# Patient Record
Sex: FEMALE | Race: WHITE | NOT HISPANIC OR LATINO | Employment: FULL TIME | URBAN - METROPOLITAN AREA
[De-identification: names, ages, dates, MRNs, and addresses within clinical notes are randomized per-mention and may not be internally consistent; named-entity substitution may affect disease eponyms.]

---

## 2017-05-31 ENCOUNTER — HOSPITAL ENCOUNTER (OUTPATIENT)
Facility: MEDICAL CENTER | Age: 57
End: 2017-05-31
Attending: INTERNAL MEDICINE
Payer: OTHER GOVERNMENT

## 2017-05-31 PROCEDURE — 83615 LACTATE (LD) (LDH) ENZYME: CPT

## 2017-05-31 PROCEDURE — 80053 COMPREHEN METABOLIC PANEL: CPT

## 2017-06-01 LAB
ALBUMIN SERPL BCP-MCNC: 4.3 G/DL (ref 3.2–4.9)
ALBUMIN/GLOB SERPL: 1.7 G/DL
ALP SERPL-CCNC: 96 U/L (ref 30–99)
ALT SERPL-CCNC: 24 U/L (ref 2–50)
AMBIGUOUS DTTM AMBI4: NORMAL
ANION GAP SERPL CALC-SCNC: 7 MMOL/L (ref 0–11.9)
AST SERPL-CCNC: 16 U/L (ref 12–45)
BILIRUB SERPL-MCNC: 0.3 MG/DL (ref 0.1–1.5)
BUN SERPL-MCNC: 15 MG/DL (ref 8–22)
CALCIUM SERPL-MCNC: 10.1 MG/DL (ref 8.5–10.5)
CHLORIDE SERPL-SCNC: 109 MMOL/L (ref 96–112)
CO2 SERPL-SCNC: 25 MMOL/L (ref 20–33)
CREAT SERPL-MCNC: 0.71 MG/DL (ref 0.5–1.4)
GFR SERPL CREATININE-BSD FRML MDRD: >60 ML/MIN/1.73 M 2
GLOBULIN SER CALC-MCNC: 2.5 G/DL (ref 1.9–3.5)
GLUCOSE SERPL-MCNC: 113 MG/DL (ref 65–99)
LDH SERPL-CCNC: 148 U/L (ref 107–266)
POTASSIUM SERPL-SCNC: 4.8 MMOL/L (ref 3.6–5.5)
PROT SERPL-MCNC: 6.8 G/DL (ref 6–8.2)
SODIUM SERPL-SCNC: 141 MMOL/L (ref 135–145)

## 2017-10-02 ENCOUNTER — APPOINTMENT (OUTPATIENT)
Dept: RADIOLOGY | Facility: IMAGING CENTER | Age: 57
End: 2017-10-02
Attending: PHYSICIAN ASSISTANT
Payer: OTHER GOVERNMENT

## 2017-10-02 ENCOUNTER — OFFICE VISIT (OUTPATIENT)
Dept: URGENT CARE | Facility: PHYSICIAN GROUP | Age: 57
End: 2017-10-02
Payer: OTHER GOVERNMENT

## 2017-10-02 VITALS
RESPIRATION RATE: 12 BRPM | HEIGHT: 66 IN | BODY MASS INDEX: 24.75 KG/M2 | WEIGHT: 154 LBS | HEART RATE: 84 BPM | OXYGEN SATURATION: 96 % | DIASTOLIC BLOOD PRESSURE: 70 MMHG | SYSTOLIC BLOOD PRESSURE: 100 MMHG | TEMPERATURE: 98 F

## 2017-10-02 DIAGNOSIS — M54.50 ACUTE EXACERBATION OF CHRONIC LOW BACK PAIN: ICD-10-CM

## 2017-10-02 DIAGNOSIS — G89.29 ACUTE EXACERBATION OF CHRONIC LOW BACK PAIN: ICD-10-CM

## 2017-10-02 PROCEDURE — 72100 X-RAY EXAM L-S SPINE 2/3 VWS: CPT | Mod: TC | Performed by: PHYSICIAN ASSISTANT

## 2017-10-02 PROCEDURE — 99203 OFFICE O/P NEW LOW 30 MIN: CPT | Performed by: PHYSICIAN ASSISTANT

## 2017-10-02 RX ORDER — CYCLOBENZAPRINE HCL 10 MG
10 TABLET ORAL 3 TIMES DAILY PRN
Qty: 30 TAB | Refills: 0 | Status: SHIPPED
Start: 2017-10-02 | End: 2020-04-30

## 2017-10-02 RX ORDER — HYDROXYUREA 500 MG/1
CAPSULE ORAL DAILY
COMMUNITY

## 2017-10-02 NOTE — PROGRESS NOTES
Chief Complaint   Patient presents with   • Back Pain     ongoing injury, twisted at home, dull pain       HISTORY OF PRESENT ILLNESS: Patient is a 57 y.o. female who presents today for the following:    Patient complains of low back pain that started yesterday. Patient has a history of the same off and on over the last several years. She states only takes the slightest movement to cause problems. She has been taking ibuprofen 800, some unknown muscle relaxer, and heat, and states that all 3 have been helping. She had a difficult time even getting out of bed this morning was having so much pain she was unable to support her weight. She denies any radiating pain, distal paresthesias, bowel/bladder incontinence changes and extremity weakness. She has never had any imaging of her back. She is using a cane today just to help support walking.    There are no active problems to display for this patient.      Allergies:Review of patient's allergies indicates no known allergies.    Current Outpatient Prescriptions Ordered in [facility]   Medication Sig Dispense Refill   • hydroxyurea (HYDREA) 500 MG Cap Take  by mouth every day.     • Probiotic Product (SOLUBLE FIBER/PROBIOTICS PO) Take  by mouth every day.       No current [facility]-ordered facility-administered medications on file.        Past Medical History:   Diagnosis Date   • Other specified symptom associated with female genital organs     bleeding   • Thrombocytosis (CMS-HCC)    • Unspecified urinary incontinence     slight amount sometimes       Social History   Substance Use Topics   • Smoking status: Never Smoker   • Smokeless tobacco: Never Used   • Alcohol use No       Family Status   Relation Status   • Mother Alive   • Father Alive   • Sister Alive     Family History   Problem Relation Age of Onset   • Diabetes Mother        ROS:    Review of Systems   Constitutional: Negative for fever, chills, weight loss and malaise/fatigue.   HENT: Negative for ear pain,  "nosebleeds, congestion, sore throat and neck pain.    Eyes: Negative for blurred vision.   Respiratory: Negative for cough, sputum production, shortness of breath and wheezing.    Cardiovascular: Negative for chest pain, palpitations, orthopnea and leg swelling.   Gastrointestinal: Negative for heartburn, nausea, vomiting and abdominal pain.   Genitourinary: Negative for dysuria, urgency and frequency.       Exam:  Blood pressure 100/70, pulse 84, temperature 36.7 °C (98 °F), resp. rate 12, height 1.676 m (5' 6\"), weight 69.9 kg (154 lb), last menstrual period 06/09/2009, SpO2 96 %.  General: Well developed, well nourished. No distress.  HEENT:Head is grossly normal.  Pulmonary: No respiratory distress noted.  Back: Decreased range of motion in all planes due to pain. Worsening pain going from a seated to standing position. No localized tenderness noted.  Neurologic: No sensory deficit noted.  Extremities: No motor deficit noted. Prepatellar DTRs are strong bilaterally. Strong plantar/dorsiflexion.  Skin: Warm, dry, good turgor. No rashes in visible areas.   Psych: Normal mood. Alert and oriented x3. Judgment and insight is normal.    Lumbar x-ray, per radiology:  Impression       1.  Minimal upper lumbar scoliosis or spasm convex right.    2.  No compression fracture or disc space narrowing.       Assessment/Plan:  Apply heat/ice to the affected area. Take all medication instructed. Discussed appropriate over-the-counter symptomatic medication, and when to return to clinic. Follow up with Primary care provider for worsening or persistent symptoms as physical therapy may be necessary.  1. Acute exacerbation of chronic low back pain  DX-LUMBAR SPINE-2 OR 3 VIEWS       "

## 2019-04-24 ENCOUNTER — HOSPITAL ENCOUNTER (OUTPATIENT)
Facility: MEDICAL CENTER | Age: 59
End: 2019-04-24
Attending: PHYSICIAN ASSISTANT
Payer: OTHER GOVERNMENT

## 2019-04-24 ENCOUNTER — OFFICE VISIT (OUTPATIENT)
Dept: URGENT CARE | Facility: PHYSICIAN GROUP | Age: 59
End: 2019-04-24
Payer: OTHER GOVERNMENT

## 2019-04-24 VITALS
BODY MASS INDEX: 25.82 KG/M2 | HEART RATE: 81 BPM | OXYGEN SATURATION: 97 % | TEMPERATURE: 97.6 F | SYSTOLIC BLOOD PRESSURE: 124 MMHG | RESPIRATION RATE: 16 BRPM | WEIGHT: 160 LBS | DIASTOLIC BLOOD PRESSURE: 82 MMHG

## 2019-04-24 DIAGNOSIS — N30.01 ACUTE CYSTITIS WITH HEMATURIA: ICD-10-CM

## 2019-04-24 DIAGNOSIS — R35.0 URINARY FREQUENCY: ICD-10-CM

## 2019-04-24 DIAGNOSIS — R30.0 DYSURIA: ICD-10-CM

## 2019-04-24 DIAGNOSIS — R39.15 URINARY URGENCY: ICD-10-CM

## 2019-04-24 LAB
APPEARANCE UR: CLEAR
BILIRUB UR STRIP-MCNC: NORMAL MG/DL
COLOR UR AUTO: YELLOW
GLUCOSE UR STRIP.AUTO-MCNC: NORMAL MG/DL
KETONES UR STRIP.AUTO-MCNC: NORMAL MG/DL
LEUKOCYTE ESTERASE UR QL STRIP.AUTO: NORMAL
NITRITE UR QL STRIP.AUTO: NORMAL
PH UR STRIP.AUTO: 5.5 [PH] (ref 5–8)
PROT UR QL STRIP: NORMAL MG/DL
RBC UR QL AUTO: NORMAL
SP GR UR STRIP.AUTO: 1.01
UROBILINOGEN UR STRIP-MCNC: 0.2 MG/DL

## 2019-04-24 PROCEDURE — 87077 CULTURE AEROBIC IDENTIFY: CPT

## 2019-04-24 PROCEDURE — 99214 OFFICE O/P EST MOD 30 MIN: CPT | Performed by: PHYSICIAN ASSISTANT

## 2019-04-24 PROCEDURE — 87086 URINE CULTURE/COLONY COUNT: CPT

## 2019-04-24 PROCEDURE — 81002 URINALYSIS NONAUTO W/O SCOPE: CPT | Performed by: PHYSICIAN ASSISTANT

## 2019-04-24 PROCEDURE — 87186 SC STD MICRODIL/AGAR DIL: CPT

## 2019-04-24 RX ORDER — NITROFURANTOIN 25; 75 MG/1; MG/1
100 CAPSULE ORAL EVERY 12 HOURS
Qty: 10 CAP | Refills: 0 | Status: SHIPPED | OUTPATIENT
Start: 2019-04-24 | End: 2019-04-29

## 2019-04-24 NOTE — PROGRESS NOTES
Chief Complaint   Patient presents with   • UTI     x 4 days    • Urinary Frequency       HISTORY OF PRESENT ILLNESS: Patient is a 59 y.o. female who presents today for the following:    Patient comes in for evaluation of dysuria that started 3 to 4 days ago.  She complains of increased urinary frequency and urgency.  She denies hematuria, abdominal pain, flank pain, nausea, vomiting, fever.  She does report a history of the same about 3 years ago.    There are no active problems to display for this patient.      Allergies:Patient has no known allergies.    Current Outpatient Prescriptions Ordered in [facility]   Medication Sig Dispense Refill   • nitrofurantoin monohyd macro (MACROBID) 100 MG Cap Take 1 Cap by mouth every 12 hours for 5 days. 10 Cap 0   • hydroxyurea (HYDREA) 500 MG Cap Take  by mouth every day.     • Probiotic Product (SOLUBLE FIBER/PROBIOTICS PO) Take  by mouth every day.     • cyclobenzaprine (FLEXERIL) 10 MG Tab Take 1 Tab by mouth 3 times a day as needed for Mild Pain or Moderate Pain. 30 Tab 0     No current Epic-ordered facility-administered medications on file.        Past Medical History:   Diagnosis Date   • Other specified symptom associated with female genital organs     bleeding   • Thrombocytosis (HCC)    • Unspecified urinary incontinence     slight amount sometimes       Social History   Substance Use Topics   • Smoking status: Never Smoker   • Smokeless tobacco: Never Used   • Alcohol use No       Family Status   Relation Status   • Mo Alive   • Fa Alive   • Sis Alive     Family History   Problem Relation Age of Onset   • Diabetes Mother        Review of Systems:    Constitutional ROS: No unexpected change in weight, No weakness, No fatigue  Eye ROS: No recent significant change in vision, No eye pain, redness, discharge  Ear ROS: No drainage, No tinnitus or vertigo, No recent change in hearing  Mouth/Throat ROS: No teeth or gum problems, No bleeding gums, No tongue complaints  Neck ROS:  No swollen glands, No significant pain in neck  Pulmonary ROS: No chronic cough, sputum, or hemoptysis, No dyspnea on exertion, No wheezing  Cardiovascular ROS: No diaphoresis, No edema, No palpitations  Gastrointestinal ROS: No change in bowel habits, No significant change in appetite, No nausea, vomiting, diarrhea, or constipation  Musculoskeletal/Extremities ROS: No peripheral edema, No pain, redness or swelling on the joints  Hematologic/Lymphatic ROS: No chills, No night sweats, No weight loss  Skin/Integumentary ROS: No edema, No evidence of rash, No itching      Exam:  /82   Pulse 81   Temp 36.4 °C (97.6 °F) (Temporal)   Resp 16   Wt 72.6 kg (160 lb)   SpO2 97%   General: Well developed, well nourished. No distress.  Pulmonary: Unlabored respiratory effort. Lungs clear to auscultation, no wheezes, no rhonchi.  Cardiovascular: Regular rate and rhythm without murmur.   Back: No CVA tenderness noted.  Neurologic: Grossly nonfocal. No facial asymmetry noted.  Skin: Warm, dry, good turgor. No rashes in visible areas.   Psych: Normal mood. Alert and oriented x3. Judgment and insight is normal.    UA: Large blood, large leukocyte esterase, otherwise negative    Assessment/Plan:  Drink plenty fluids.  Use all medication as directed.  Will contact patient with culture result. Follow up for worsening or persistent symptoms.  1. Acute cystitis with hematuria  Urine Culture    nitrofurantoin monohyd macro (MACROBID) 100 MG Cap   2. Dysuria  POCT Urinalysis   3. Urinary frequency  POCT Urinalysis   4. Urinary urgency  POCT Urinalysis   \

## 2019-04-26 DIAGNOSIS — N39.0 URINARY TRACT INFECTION WITHOUT HEMATURIA, SITE UNSPECIFIED: ICD-10-CM

## 2019-04-26 LAB
BACTERIA UR CULT: ABNORMAL
BACTERIA UR CULT: ABNORMAL
SIGNIFICANT IND 70042: ABNORMAL
SITE SITE: ABNORMAL
SOURCE SOURCE: ABNORMAL

## 2019-04-26 RX ORDER — SULFAMETHOXAZOLE AND TRIMETHOPRIM 800; 160 MG/1; MG/1
1 TABLET ORAL 2 TIMES DAILY
Qty: 10 TAB | Refills: 0 | Status: SHIPPED | OUTPATIENT
Start: 2019-04-26 | End: 2019-05-01

## 2020-04-30 ENCOUNTER — OFFICE VISIT (OUTPATIENT)
Dept: URGENT CARE | Facility: PHYSICIAN GROUP | Age: 60
End: 2020-04-30
Payer: OTHER GOVERNMENT

## 2020-04-30 VITALS
TEMPERATURE: 98.2 F | DIASTOLIC BLOOD PRESSURE: 78 MMHG | BODY MASS INDEX: 31.82 KG/M2 | OXYGEN SATURATION: 97 % | RESPIRATION RATE: 14 BRPM | SYSTOLIC BLOOD PRESSURE: 122 MMHG | WEIGHT: 198 LBS | HEIGHT: 66 IN | HEART RATE: 79 BPM

## 2020-04-30 DIAGNOSIS — N30.01 ACUTE CYSTITIS WITH HEMATURIA: ICD-10-CM

## 2020-04-30 DIAGNOSIS — R30.9 URINARY PAIN: ICD-10-CM

## 2020-04-30 LAB
APPEARANCE UR: CLEAR
BILIRUB UR STRIP-MCNC: NEGATIVE MG/DL
COLOR UR AUTO: YELLOW
GLUCOSE UR STRIP.AUTO-MCNC: NEGATIVE MG/DL
KETONES UR STRIP.AUTO-MCNC: NEGATIVE MG/DL
LEUKOCYTE ESTERASE UR QL STRIP.AUTO: NORMAL
NITRITE UR QL STRIP.AUTO: NEGATIVE
PH UR STRIP.AUTO: 5.5 [PH] (ref 5–8)
PROT UR QL STRIP: NEGATIVE MG/DL
RBC UR QL AUTO: NORMAL
SP GR UR STRIP.AUTO: 1.02
UROBILINOGEN UR STRIP-MCNC: 0.2 MG/DL

## 2020-04-30 PROCEDURE — 81002 URINALYSIS NONAUTO W/O SCOPE: CPT | Performed by: NURSE PRACTITIONER

## 2020-04-30 PROCEDURE — 99214 OFFICE O/P EST MOD 30 MIN: CPT | Performed by: NURSE PRACTITIONER

## 2020-04-30 RX ORDER — SULFAMETHOXAZOLE AND TRIMETHOPRIM 800; 160 MG/1; MG/1
1 TABLET ORAL 2 TIMES DAILY
Qty: 6 TAB | Refills: 0 | Status: SHIPPED | OUTPATIENT
Start: 2020-04-30 | End: 2020-05-03

## 2020-04-30 RX ORDER — PHENAZOPYRIDINE HYDROCHLORIDE 200 MG/1
TABLET, FILM COATED ORAL
COMMUNITY
Start: 2020-02-01

## 2020-04-30 RX ORDER — NITROFURANTOIN 25; 75 MG/1; MG/1
CAPSULE ORAL
COMMUNITY
Start: 2020-02-01 | End: 2020-04-30

## 2020-04-30 RX ORDER — HYDROXYUREA 500 MG/1
CAPSULE ORAL
COMMUNITY
Start: 2014-08-21 | End: 2020-04-30

## 2020-04-30 NOTE — PROGRESS NOTES
Subjective:      Karmen Bassett is a 60 y.o. female who presents with UTI (pain with urniation 3x days.)          Subjective:      CC:  presents with Dysuria        Dysuria   This is a new problem. The current episode started in the past 3 days. The problem occurs every urination. The problem has been unchanged. The quality of the pain is described as burning. There has been no fever. Pt is not sexually active. There is no history of pyelonephritis. Associated symptoms include frequency and urgency. Pertinent negatives include no chills, discharge, flank pain, nausea or vomiting. Pt has tried cranberry for the symptoms. There is no history of recurrent UTIs.     Social History     Socioeconomic History   • Marital status:      Spouse name: Not on file   • Number of children: Not on file   • Years of education: Not on file   • Highest education level: Not on file   Occupational History   • Not on file   Social Needs   • Financial resource strain: Not on file   • Food insecurity     Worry: Not on file     Inability: Not on file   • Transportation needs     Medical: Not on file     Non-medical: Not on file   Tobacco Use   • Smoking status: Never Smoker   • Smokeless tobacco: Never Used   Substance and Sexual Activity   • Alcohol use: No   • Drug use: No   • Sexual activity: Yes     Partners: Male   Lifestyle   • Physical activity     Days per week: Not on file     Minutes per session: Not on file   • Stress: Not on file   Relationships   • Social connections     Talks on phone: Not on file     Gets together: Not on file     Attends [employer] service: Not on file     Active member of club or organization: Not on file     Attends meetings of clubs or organizations: Not on file     Relationship status: Not on file   • Intimate partner violence     Fear of current or ex partner: Not on file     Emotionally abused: Not on file     Physically abused: Not on file     Forced sexual activity: Not on file   Other  "Topics Concern   • Not on file   Social History Narrative   • Not on file         Family History   Problem Relation Age of Onset   • Diabetes Mother          No Known Allergies        Current Outpatient Medications on File Prior to Visit   Medication Sig Dispense Refill   • hydroxyurea (HYDREA) 500 MG Cap Take  by mouth every day.     • phenazopyridine (PYRIDIUM) 200 MG Tab      • Probiotic Product (SOLUBLE FIBER/PROBIOTICS PO) Take  by mouth every day.       No current facility-administered medications on file prior to visit.        Review of Systems   Constitutional: Negative for chills.   Respiratory: Negative for shortness of breath.    Cardiovascular: Negative for chest pain.   Gastrointestinal: Negative for nausea, vomiting and abdominal pain.   Genitourinary: Positive for dysuria, urgency and frequency. Negative for flank pain.   Skin: Negative for rash.   Neurological: Negative for dizziness and headaches.   All other systems reviewed and are negative.         Objective:      /78   Pulse 79   Temp 36.8 °C (98.2 °F) (Temporal)   Resp 14   Ht 1.676 m (5' 6\")   Wt 89.8 kg (198 lb)   SpO2 97%       Physical Exam   Constitutional: pt is oriented to person, place, and time. Pt appears well-developed and well-nourished. No distress.   HENT:   Head: Normocephalic and atraumatic.   Mouth/Throat: Mucous membranes are normal.   Eyes: Conjunctivae and EOM are normal. Pupils are equal, round, and reactive to light. Right eye exhibits no discharge. Left eye exhibits no discharge. No scleral icterus.   Neck: Normal range of motion. Neck supple.   Cardiovascular: Normal rate, regular rhythm, normal heart sounds and intact distal pulses.    No murmur heard.  Pulmonary/Chest: Effort normal and breath sounds normal. No respiratory distress. Pt has no wheezes,  rales.   Abdominal: Bowel sounds are normal. Pt exhibits no distension and no mass. There is no tenderness. There is no rebound, no guarding and no CVA " tenderness.   Neurological: pt is alert and oriented to person, place, and time.   Skin: Skin is warm and dry.   Psychiatric: behavior is normal.   Nursing note and vitals reviewed.           Lab Results   Component Value Date/Time    POCCOLOR yellow 04/30/2020 01:46 PM    POCAPPEAR clear 04/30/2020 01:46 PM    POCLEUKEST small 04/30/2020 01:46 PM    POCNITRITE negative 04/30/2020 01:46 PM    POCUROBILIGE 0.2 04/30/2020 01:46 PM    POCPROTEIN negative 04/30/2020 01:46 PM    POCURPH 5.5 04/30/2020 01:46 PM    POCBLOOD trace-intact 04/30/2020 01:46 PM    POCSPGRV 1.025 04/30/2020 01:46 PM    POCKETONES negative 04/30/2020 01:46 PM    POCBILIRUBIN negative 04/30/2020 01:46 PM    POCGLUCUA negative 04/30/2020 01:46 PM            Assessment/Plan:     1. Acute cystitis with hematuria  Last culture showed resistance to Nitrofurantoin. Not enough urine to culture today.   - POCT Urinalysis  - sulfamethoxazole-trimethoprim (BACTRIM DS) 800-160 MG tablet; Take 1 Tab by mouth 2 times a day for 3 days.  Dispense: 6 Tab; Refill: 0        Instructed to return to  or nearest emergency department if symptoms fail to improve, for any change in condition, further concerns, or new concerning symptoms.  Patient states understanding of the plan of care and discharge instructions.

## 2020-05-28 ENCOUNTER — OFFICE VISIT (OUTPATIENT)
Dept: URGENT CARE | Facility: PHYSICIAN GROUP | Age: 60
End: 2020-05-28
Payer: OTHER GOVERNMENT

## 2020-05-28 VITALS
BODY MASS INDEX: 31.98 KG/M2 | DIASTOLIC BLOOD PRESSURE: 80 MMHG | SYSTOLIC BLOOD PRESSURE: 124 MMHG | WEIGHT: 199 LBS | HEIGHT: 66 IN | TEMPERATURE: 98.3 F | OXYGEN SATURATION: 97 % | RESPIRATION RATE: 16 BRPM | HEART RATE: 80 BPM

## 2020-05-28 DIAGNOSIS — R30.9 PAINFUL URINATION: ICD-10-CM

## 2020-05-28 DIAGNOSIS — N30.01 ACUTE CYSTITIS WITH HEMATURIA: ICD-10-CM

## 2020-05-28 DIAGNOSIS — N39.0 RECURRENT URINARY TRACT INFECTION: ICD-10-CM

## 2020-05-28 LAB
APPEARANCE UR: ABNORMAL
BILIRUB UR STRIP-MCNC: NEGATIVE MG/DL
COLOR UR AUTO: YELLOW
GLUCOSE UR STRIP.AUTO-MCNC: NEGATIVE MG/DL
KETONES UR STRIP.AUTO-MCNC: NEGATIVE MG/DL
LEUKOCYTE ESTERASE UR QL STRIP.AUTO: ABNORMAL
NITRITE UR QL STRIP.AUTO: NEGATIVE
PH UR STRIP.AUTO: 7 [PH] (ref 5–8)
PROT UR QL STRIP: NEGATIVE MG/DL
RBC UR QL AUTO: ABNORMAL
SP GR UR STRIP.AUTO: 1.01
UROBILINOGEN UR STRIP-MCNC: 0.2 MG/DL

## 2020-05-28 PROCEDURE — 81002 URINALYSIS NONAUTO W/O SCOPE: CPT | Performed by: PHYSICIAN ASSISTANT

## 2020-05-28 PROCEDURE — 99213 OFFICE O/P EST LOW 20 MIN: CPT | Performed by: PHYSICIAN ASSISTANT

## 2020-05-28 RX ORDER — CEPHALEXIN 500 MG/1
500 CAPSULE ORAL 2 TIMES DAILY
Qty: 14 CAP | Refills: 0 | Status: SHIPPED | OUTPATIENT
Start: 2020-05-28 | End: 2020-06-04

## 2020-05-28 ASSESSMENT — ENCOUNTER SYMPTOMS
FLANK PAIN: 0
SORE THROAT: 0
EYE PAIN: 0
VOMITING: 0
FEVER: 0
MYALGIAS: 0
SHORTNESS OF BREATH: 0
COUGH: 0
NAUSEA: 0
ABDOMINAL PAIN: 0
CONSTIPATION: 0
DIARRHEA: 0
HEADACHES: 0
CHILLS: 0

## 2020-05-28 NOTE — PATIENT INSTRUCTIONS
Urinary Tract Infection, Adult  Introduction  A urinary tract infection (UTI) is an infection of any part of the urinary tract. The urinary tract includes the:  · Kidneys.  · Ureters.  · Bladder.  · Urethra.  These organs make, store, and get rid of pee (urine) in the body.  Follow these instructions at home:  · Take over-the-counter and prescription medicines only as told by your doctor.  · If you were prescribed an antibiotic medicine, take it as told by your doctor. Do not stop taking the antibiotic even if you start to feel better.  · Avoid the following drinks:  ¨ Alcohol.  ¨ Caffeine.  ¨ Tea.  ¨ Carbonated drinks.  · Drink enough fluid to keep your pee clear or pale yellow.  · Keep all follow-up visits as told by your doctor. This is important.  · Make sure to:  ¨ Empty your bladder often and completely. Do not to hold pee for long periods of time.  ¨ Empty your bladder before and after sex.  ¨ Wipe from front to back after a bowel movement if you are female. Use each tissue one time when you wipe.  Contact a doctor if:  · You have back pain.  · You have a fever.  · You feel sick to your stomach (nauseous).  · You throw up (vomit).  · Your symptoms do not get better after 3 days.  · Your symptoms go away and then come back.  Get help right away if:  · You have very bad back pain.  · You have very bad lower belly (abdominal) pain.  · You are throwing up and cannot keep down any medicines or water.  This information is not intended to replace advice given to you by your health care provider. Make sure you discuss any questions you have with your health care provider.  Document Released: 06/05/2009 Document Revised: 05/25/2017 Document Reviewed: 11/07/2016  © 2017 [employer]

## 2020-05-28 NOTE — PROGRESS NOTES
"Subjective:   Karmen Bassett is a 60 y.o. female who presents for Painful Urination (pressure/ had one a month ago)      This 60-year-old female presents complaining of dysuria, frequency, urgency, and some bladder irritation that is been waxing and waning for about a month.  This is her fourth episode of urinary discomfort in the last 6 months.  She was seen about a month ago and was unable to produce enough urine to have it cultured however was empirically treated with Bactrim and she states it had minimal effect on her symptoms.  She has been self treating with cranberry juice and staying hydrated.  She denies any fever/chills and back pain.  She has not noted any vaginal discharge, rash, or lesions.      Review of Systems   Constitutional: Negative for chills and fever.   HENT: Negative for congestion, ear pain and sore throat.    Eyes: Negative for pain.   Respiratory: Negative for cough and shortness of breath.    Cardiovascular: Negative for chest pain.   Gastrointestinal: Negative for abdominal pain, constipation, diarrhea, nausea and vomiting.   Genitourinary: Positive for dysuria, frequency and urgency. Negative for flank pain and hematuria.   Musculoskeletal: Negative for myalgias.   Skin: Negative for rash.   Neurological: Negative for headaches.       Medications, Allergies, and current problem list reviewed today in Epic.     Objective:     /80   Pulse 80   Temp 36.8 °C (98.3 °F) (Temporal)   Resp 16   Ht 1.676 m (5' 6\")   Wt 90.3 kg (199 lb)   SpO2 97%     Physical Exam  Vitals signs reviewed.   Constitutional:       Appearance: Normal appearance. She is not ill-appearing.   HENT:      Head: Normocephalic and atraumatic.      Right Ear: External ear normal.      Left Ear: External ear normal.      Nose: Nose normal.      Mouth/Throat:      Mouth: Mucous membranes are moist.   Eyes:      Conjunctiva/sclera: Conjunctivae normal.   Cardiovascular:      Rate and Rhythm: Normal rate. "   Pulmonary:      Effort: Pulmonary effort is normal.   Abdominal:      Tenderness: There is no abdominal tenderness. There is no right CVA tenderness or left CVA tenderness.   Skin:     General: Skin is warm and dry.      Capillary Refill: Capillary refill takes less than 2 seconds.   Neurological:      Mental Status: She is alert and oriented to person, place, and time.         Lab Results/POC Test Results   Results for orders placed or performed in visit on 05/28/20   POCT Urinalysis   Result Value Ref Range    POC Color yellow Negative    POC Appearance cloudy Negative    POC Leukocyte Esterase large Negative    POC Nitrites negative Negative    POC Urobiligen 0.2 Negative (0.2) mg/dL    POC Protein negative Negative mg/dL    POC Urine PH 7.0 5.0 - 8.0    POC Blood small Negative    POC Specific Gravity 1.015 <1.005 - >1.030    POC Ketones negative Negative mg/dL    POC Bilirubin negative Negative mg/dL    POC Glucose negative Negative mg/dL           Assessment/Plan:     Diagnosis and associated orders:     1. Painful urination  POCT Urinalysis   2. Acute cystitis with hematuria  cephALEXin (KEFLEX) 500 MG Cap    URINE CULTURE(NEW)      Comments/MDM:       • Empiric treatment with Keflex  • Warned about signs and symptoms of pyelonephritis and return precautions  • Will culture urine patient gave verbal permission to contact her and leave a voicemail if we need to change her antibiotic, otherwise will not contact her  • Referral to urology as this seems to be the fourth time in recent months.  She is attributing this to menopause but is worth further investigation.             Differential diagnosis, natural history, supportive care, and indications for immediate follow-up discussed.    Advised the patient to follow-up with the primary care physician for recheck, reevaluation, and consideration of further management.    Please note that this dictation was created using voice recognition software. I have worked  with consultants from the vendor as well as technical experts from [facility] to optimize the interface. I have made every reasonable attempt to correct obvious errors, but I expect that there are errors of grammar and possibly content that I did not discover before finalizing the note.    This note was electronically signed by Abdoulaye Franks PA-C

## 2020-10-02 ENCOUNTER — OFFICE VISIT (OUTPATIENT)
Dept: URGENT CARE | Facility: PHYSICIAN GROUP | Age: 60
End: 2020-10-02
Payer: OTHER GOVERNMENT

## 2020-10-02 VITALS
RESPIRATION RATE: 16 BRPM | BODY MASS INDEX: 32.47 KG/M2 | DIASTOLIC BLOOD PRESSURE: 76 MMHG | HEIGHT: 66 IN | OXYGEN SATURATION: 96 % | TEMPERATURE: 99.1 F | HEART RATE: 90 BPM | WEIGHT: 202 LBS | SYSTOLIC BLOOD PRESSURE: 112 MMHG

## 2020-10-02 DIAGNOSIS — N30.01 ACUTE CYSTITIS WITH HEMATURIA: ICD-10-CM

## 2020-10-02 DIAGNOSIS — R30.0 DYSURIA: ICD-10-CM

## 2020-10-02 LAB
APPEARANCE UR: NORMAL
BILIRUB UR STRIP-MCNC: NEGATIVE MG/DL
COLOR UR AUTO: YELLOW
GLUCOSE UR STRIP.AUTO-MCNC: NEGATIVE MG/DL
KETONES UR STRIP.AUTO-MCNC: NEGATIVE MG/DL
LEUKOCYTE ESTERASE UR QL STRIP.AUTO: NORMAL
NITRITE UR QL STRIP.AUTO: NEGATIVE
PH UR STRIP.AUTO: 7 [PH] (ref 5–8)
PROT UR QL STRIP: NEGATIVE MG/DL
RBC UR QL AUTO: NORMAL
SP GR UR STRIP.AUTO: 1.01
UROBILINOGEN UR STRIP-MCNC: 0.2 MG/DL

## 2020-10-02 PROCEDURE — 99214 OFFICE O/P EST MOD 30 MIN: CPT | Performed by: NURSE PRACTITIONER

## 2020-10-02 PROCEDURE — 81002 URINALYSIS NONAUTO W/O SCOPE: CPT | Performed by: NURSE PRACTITIONER

## 2020-10-02 RX ORDER — TRIAMCINOLONE ACETONIDE 1 MG/G
OINTMENT TOPICAL
COMMUNITY
Start: 2020-07-23

## 2020-10-02 RX ORDER — PHENAZOPYRIDINE HYDROCHLORIDE 200 MG/1
200 TABLET, FILM COATED ORAL 3 TIMES DAILY
Qty: 6 TAB | Refills: 0 | Status: SHIPPED | OUTPATIENT
Start: 2020-10-02 | End: 2020-10-04

## 2020-10-02 RX ORDER — NITROFURANTOIN 25; 75 MG/1; MG/1
100 CAPSULE ORAL 2 TIMES DAILY
COMMUNITY
Start: 2020-08-31

## 2020-10-02 RX ORDER — CEFDINIR 300 MG/1
300 CAPSULE ORAL EVERY 12 HOURS
Qty: 14 CAP | Refills: 0 | Status: CANCELLED | OUTPATIENT
Start: 2020-10-02 | End: 2020-10-09

## 2020-10-02 RX ORDER — CLOBETASOL PROPIONATE 0.5 MG/G
OINTMENT TOPICAL
COMMUNITY
Start: 2020-07-23

## 2020-10-02 RX ORDER — CEPHALEXIN 500 MG/1
500 CAPSULE ORAL EVERY 6 HOURS
Qty: 28 CAP | Refills: 0 | Status: SHIPPED | OUTPATIENT
Start: 2020-10-02 | End: 2020-10-09

## 2020-10-02 ASSESSMENT — ENCOUNTER SYMPTOMS
CONSTIPATION: 0
FEVER: 0
DIZZINESS: 0
CHILLS: 0
FLANK PAIN: 0
VOMITING: 0
HEADACHES: 0
DIARRHEA: 0
BACK PAIN: 0
NAUSEA: 0
MYALGIAS: 0
ABDOMINAL PAIN: 0
WEAKNESS: 0

## 2020-10-03 NOTE — PROGRESS NOTES
Subjective:      Karmen Bassett is a 60 y.o. female who presents with UTI (burning sensation with pressure )            HPI   C/o dysuria, urgency, frequency x 3 days.  has been taking cranberry like supplement that has improved pain.  has been having recurrent UTIs since 4/2020. Has appoint with Urology this month. Denies fever, n/v or abdominal/back pain. Denies hematuria.     PMH:  has a past medical history of Other specified symptom associated with female genital organs, Thrombocytosis (HCC), and Unspecified urinary incontinence.  MEDS:   Current Outpatient Medications:   •  cephALEXin (KEFLEX) 500 MG Cap, Take 1 Cap by mouth every 6 hours for 7 days., Disp: 28 Cap, Rfl: 0  •  phenazopyridine (PYRIDIUM) 200 MG Tab, Take 1 Tab by mouth 3 times a day for 2 days., Disp: 6 Tab, Rfl: 0  •  Cetirizine HCl (ZYRTEC PO), Take  by mouth., Disp: , Rfl:   •  hydroxyurea (HYDREA) 500 MG Cap, Take  by mouth every day., Disp: , Rfl:   •  clobetasol (TEMOVATE) 0.05 % Ointment, , Disp: , Rfl:   •  triamcinolone acetonide (KENALOG) 0.1 % Ointment, , Disp: , Rfl:   •  nitrofurantoin (MACROBID) 100 MG Cap, Take 100 mg by mouth 2 Times a Day. For 7 Days, Disp: , Rfl:   •  metronidazole (METROCREAM) 0.75 % cream, APPLY TOPICALLY TO FACE TWICE DAILY, Disp: , Rfl:   •  phenazopyridine (PYRIDIUM) 200 MG Tab, , Disp: , Rfl:   •  Probiotic Product (SOLUBLE FIBER/PROBIOTICS PO), Take  by mouth every day., Disp: , Rfl:   ALLERGIES: No Known Allergies  SURGHX:   Past Surgical History:   Procedure Laterality Date   • TUBAL COAGULATION LAPAROSCOPIC BILATERAL  1989   • OTHER      wisdom tooth extraction   • TUBAL LIGATION       SOCHX:  reports that she has never smoked. She has never used smokeless tobacco. She reports that she does not drink alcohol or use drugs.  FH: Family history was reviewed, no pertinent findings to report    Review of Systems   Constitutional: Negative for chills, fever and malaise/fatigue.  "  Gastrointestinal: Negative for abdominal pain, constipation, diarrhea, nausea and vomiting.   Genitourinary: Positive for dysuria, frequency and urgency. Negative for flank pain and hematuria.   Musculoskeletal: Negative for back pain and myalgias.   Skin: Negative for itching and rash.   Neurological: Negative for dizziness, weakness and headaches.   All other systems reviewed and are negative.         Objective:     /76   Pulse 90   Temp 37.3 °C (99.1 °F)   Resp 16   Ht 1.676 m (5' 6\")   Wt 91.6 kg (202 lb)   LMP 06/09/2009   SpO2 96%   BMI 32.60 kg/m²      Physical Exam  Vitals signs reviewed.   Constitutional:       General: She is awake. She is not in acute distress.     Appearance: Normal appearance. She is well-developed. She is not ill-appearing, toxic-appearing or diaphoretic.   HENT:      Head: Normocephalic.   Eyes:      Conjunctiva/sclera: Conjunctivae normal.      Pupils: Pupils are equal, round, and reactive to light.   Neck:      Musculoskeletal: Normal range of motion and neck supple.   Cardiovascular:      Rate and Rhythm: Normal rate.   Pulmonary:      Effort: Pulmonary effort is normal.   Abdominal:      General: Bowel sounds are normal. There is no distension.      Palpations: Abdomen is soft.      Tenderness: There is no abdominal tenderness. There is no right CVA tenderness, left CVA tenderness, guarding or rebound.   Musculoskeletal: Normal range of motion.   Skin:     General: Skin is warm and dry.   Neurological:      Mental Status: She is alert and oriented to person, place, and time.   Psychiatric:         Behavior: Behavior is cooperative.                 Assessment/Plan:        1. Acute cystitis with hematuria    - cephALEXin (KEFLEX) 500 MG Cap; Take 1 Cap by mouth every 6 hours for 7 days.  Dispense: 28 Cap; Refill: 0    2. Dysuria    - POCT Urinalysis  - phenazopyridine (PYRIDIUM) 200 MG Tab; Take 1 Tab by mouth 3 times a day for 2 days.  Dispense: 6 Tab; Refill: " 0    Increase water intake  Urinate more frequently and empty bladder completely  Practice good toileting hygiene after bowel movements and sexual intercourse, refrain from sexual intercourse until infection cleared  Monitor for back/flank pain, difficulty urinating, blood in urine- need re-evaluation    Call patient mobile number for urine culture result/may leave message

## 2020-10-04 ENCOUNTER — TELEPHONE (OUTPATIENT)
Dept: URGENT CARE | Facility: CLINIC | Age: 60
End: 2020-10-04

## 2020-10-04 NOTE — TELEPHONE ENCOUNTER
Called and left message on patient mobile number regarding urine culture was not sent to lab. Please finish abx completely. If improved, please f/u Urology as scheduled later this month. If symptoms persist, can recheck urine in Urgent Care or with PCP when finished with abx..

## 2021-01-12 ENCOUNTER — HOSPITAL ENCOUNTER (OUTPATIENT)
Dept: LAB | Facility: MEDICAL CENTER | Age: 61
End: 2021-01-12
Attending: UROLOGY
Payer: OTHER GOVERNMENT

## 2021-01-12 PROCEDURE — 87186 SC STD MICRODIL/AGAR DIL: CPT

## 2021-01-12 PROCEDURE — 87077 CULTURE AEROBIC IDENTIFY: CPT

## 2021-01-12 PROCEDURE — 87086 URINE CULTURE/COLONY COUNT: CPT

## 2021-01-16 ENCOUNTER — HOSPITAL ENCOUNTER (OUTPATIENT)
Dept: LAB | Facility: MEDICAL CENTER | Age: 61
End: 2021-01-16
Attending: UROLOGY
Payer: OTHER GOVERNMENT

## 2021-01-16 LAB
ALBUMIN SERPL BCP-MCNC: 4.4 G/DL (ref 3.2–4.9)
ALBUMIN/GLOB SERPL: 1.6 G/DL
ALP SERPL-CCNC: 130 U/L (ref 30–99)
ALT SERPL-CCNC: 167 U/L (ref 2–50)
ANION GAP SERPL CALC-SCNC: 8 MMOL/L (ref 7–16)
APTT PPP: 40.1 SEC (ref 24.7–36)
AST SERPL-CCNC: 122 U/L (ref 12–45)
BASOPHILS # BLD AUTO: 1 % (ref 0–1.8)
BASOPHILS # BLD: 0.05 K/UL (ref 0–0.12)
BILIRUB SERPL-MCNC: 0.5 MG/DL (ref 0.1–1.5)
BUN SERPL-MCNC: 16 MG/DL (ref 8–22)
CALCIUM SERPL-MCNC: 10.1 MG/DL (ref 8.5–10.5)
CHLORIDE SERPL-SCNC: 105 MMOL/L (ref 96–112)
CO2 SERPL-SCNC: 25 MMOL/L (ref 20–33)
CREAT SERPL-MCNC: 0.65 MG/DL (ref 0.5–1.4)
EOSINOPHIL # BLD AUTO: 0.09 K/UL (ref 0–0.51)
EOSINOPHIL NFR BLD: 1.8 % (ref 0–6.9)
ERYTHROCYTE [DISTWIDTH] IN BLOOD BY AUTOMATED COUNT: 54.1 FL (ref 35.9–50)
GLOBULIN SER CALC-MCNC: 2.7 G/DL (ref 1.9–3.5)
GLUCOSE SERPL-MCNC: 125 MG/DL (ref 65–99)
HCT VFR BLD AUTO: 46.2 % (ref 37–47)
HGB BLD-MCNC: 14.8 G/DL (ref 12–16)
IMM GRANULOCYTES # BLD AUTO: 0.02 K/UL (ref 0–0.11)
IMM GRANULOCYTES NFR BLD AUTO: 0.4 % (ref 0–0.9)
INR PPP: 1.05 (ref 0.87–1.13)
LYMPHOCYTES # BLD AUTO: 1.34 K/UL (ref 1–4.8)
LYMPHOCYTES NFR BLD: 26.1 % (ref 22–41)
MCH RBC QN AUTO: 37.7 PG (ref 27–33)
MCHC RBC AUTO-ENTMCNC: 32 G/DL (ref 33.6–35)
MCV RBC AUTO: 117.6 FL (ref 81.4–97.8)
MONOCYTES # BLD AUTO: 0.57 K/UL (ref 0–0.85)
MONOCYTES NFR BLD AUTO: 11.1 % (ref 0–13.4)
NEUTROPHILS # BLD AUTO: 3.06 K/UL (ref 2–7.15)
NEUTROPHILS NFR BLD: 59.6 % (ref 44–72)
NRBC # BLD AUTO: 0 K/UL
NRBC BLD-RTO: 0 /100 WBC
PLATELET # BLD AUTO: 399 K/UL (ref 164–446)
PMV BLD AUTO: 10.8 FL (ref 9–12.9)
POTASSIUM SERPL-SCNC: 5.3 MMOL/L (ref 3.6–5.5)
PROT SERPL-MCNC: 7.1 G/DL (ref 6–8.2)
PROTHROMBIN TIME: 14 SEC (ref 12–14.6)
RBC # BLD AUTO: 3.93 M/UL (ref 4.2–5.4)
SODIUM SERPL-SCNC: 138 MMOL/L (ref 135–145)
WBC # BLD AUTO: 5.1 K/UL (ref 4.8–10.8)

## 2021-01-16 PROCEDURE — 85025 COMPLETE CBC W/AUTO DIFF WBC: CPT

## 2021-01-16 PROCEDURE — 80053 COMPREHEN METABOLIC PANEL: CPT

## 2021-01-16 PROCEDURE — 85730 THROMBOPLASTIN TIME PARTIAL: CPT

## 2021-01-16 PROCEDURE — 85610 PROTHROMBIN TIME: CPT

## 2021-01-16 PROCEDURE — 36415 COLL VENOUS BLD VENIPUNCTURE: CPT

## 2021-03-07 ENCOUNTER — APPOINTMENT (OUTPATIENT)
Dept: URGENT CARE | Facility: PHYSICIAN GROUP | Age: 61
End: 2021-03-07
Payer: OTHER GOVERNMENT

## 2021-03-11 ENCOUNTER — HOSPITAL ENCOUNTER (OUTPATIENT)
Dept: LAB | Facility: MEDICAL CENTER | Age: 61
End: 2021-03-11
Attending: UROLOGY
Payer: OTHER GOVERNMENT

## 2021-03-11 PROCEDURE — 87086 URINE CULTURE/COLONY COUNT: CPT

## 2021-03-14 LAB
BACTERIA UR CULT: NORMAL
SIGNIFICANT IND 70042: NORMAL
SITE SITE: NORMAL
SOURCE SOURCE: NORMAL